# Patient Record
Sex: FEMALE | Race: WHITE | HISPANIC OR LATINO | Employment: UNEMPLOYED | URBAN - METROPOLITAN AREA
[De-identification: names, ages, dates, MRNs, and addresses within clinical notes are randomized per-mention and may not be internally consistent; named-entity substitution may affect disease eponyms.]

---

## 2023-12-20 ENCOUNTER — OFFICE VISIT (OUTPATIENT)
Dept: RHEUMATOLOGY | Facility: CLINIC | Age: 53
End: 2023-12-20
Payer: COMMERCIAL

## 2023-12-20 VITALS
HEIGHT: 62 IN | BODY MASS INDEX: 22.38 KG/M2 | SYSTOLIC BLOOD PRESSURE: 95 MMHG | DIASTOLIC BLOOD PRESSURE: 65 MMHG | TEMPERATURE: 97.5 F | HEART RATE: 89 BPM | WEIGHT: 121.6 LBS

## 2023-12-20 DIAGNOSIS — M19.049 PRIMARY OSTEOARTHRITIS OF HAND, UNSPECIFIED LATERALITY: Primary | ICD-10-CM

## 2023-12-20 PROCEDURE — 99203 OFFICE O/P NEW LOW 30 MIN: CPT | Performed by: STUDENT IN AN ORGANIZED HEALTH CARE EDUCATION/TRAINING PROGRAM

## 2023-12-20 PROCEDURE — 1036F TOBACCO NON-USER: CPT | Performed by: STUDENT IN AN ORGANIZED HEALTH CARE EDUCATION/TRAINING PROGRAM

## 2023-12-20 PROCEDURE — 99213 OFFICE O/P EST LOW 20 MIN: CPT | Mod: GC | Performed by: STUDENT IN AN ORGANIZED HEALTH CARE EDUCATION/TRAINING PROGRAM

## 2023-12-20 RX ORDER — BIMATOPROST 3 UG/ML
1 SOLUTION TOPICAL SEE ADMIN INSTRUCTIONS
COMMUNITY

## 2023-12-20 RX ORDER — BENZONATATE 200 MG/1
200 CAPSULE ORAL EVERY 8 HOURS PRN
COMMUNITY
Start: 2023-12-18 | End: 2024-01-01

## 2023-12-20 RX ORDER — COVID-19 ANTIGEN TEST
KIT MISCELLANEOUS
COMMUNITY
Start: 2023-05-01

## 2023-12-20 RX ORDER — SPIRONOLACTONE 50 MG/1
50 TABLET, FILM COATED ORAL DAILY
COMMUNITY

## 2023-12-20 RX ORDER — DICLOFENAC SODIUM 10 MG/G
4 GEL TOPICAL 4 TIMES DAILY
Qty: 100 G | Refills: 1 | Status: SHIPPED | OUTPATIENT
Start: 2023-12-20

## 2023-12-20 RX ORDER — ATORVASTATIN CALCIUM 20 MG/1
TABLET, FILM COATED ORAL
COMMUNITY

## 2023-12-20 RX ORDER — ALENDRONATE SODIUM 70 MG/1
70 TABLET ORAL
COMMUNITY

## 2023-12-20 RX ORDER — SERTRALINE HYDROCHLORIDE 50 MG/1
50 TABLET, FILM COATED ORAL
COMMUNITY

## 2023-12-20 RX ORDER — AMOXICILLIN AND CLAVULANATE POTASSIUM 250; 125 MG/1; MG/1
875 TABLET, FILM COATED ORAL
COMMUNITY
End: 2024-04-30

## 2023-12-20 RX ORDER — ACETAMINOPHEN 500 MG
TABLET ORAL
COMMUNITY

## 2023-12-20 ASSESSMENT — PAIN SCALES - GENERAL: PAINLEVEL: 0-NO PAIN

## 2023-12-20 NOTE — PROGRESS NOTES
Subjective   Patient ID: [id number]   Qing Salmeron is a 53 y.o. female who presents for osteoartritis.  HPI    52 yo hashimoto's, osteoporosis on alendronate, HLD, here to rule out osteoarthritis.     No headaches, no ocular sxs, mild dry eyes, no dry mouth, no oral or nasal ulcers, No hearing loss, no bleeding, No trouble swallowing, no pleuritis or pericarditis, ,no raynauds,  no low back pain, no skin rashes, no GI sxs, no weight changes, no fevers, No DVT.  No morning stiffness.   No arthralgias.   Pain in the right second and less so in the right middle finger. From the PIP to DIP. Worse with acitivty. No dactylitis. From time to time gets some tingling in the second finger.  Hx of ganglion cyst.     3  miscarriages, three different etiologies        Diclofenac gel and teylonol    Bony hypertrophy mild in DIP. Rule out CTS?    Family hx: OP, osteoarthritis     No smoker, no acohol use, no drug use    Objective   Physical Exam  AO*4  Clear lungs [id number] RRR  Normal ROM in all joints  No active synovitis in joints  No skin rashes  Mild bony hypertrophy in the second right DIP. Mild achyness in the PIP and DIPs, no swelling or active synovitis       Assessment/Plan   52 yo hashimoto's, osteoporosis on alendronate, HLD, here to rule out osteoarthritis in the hands.    Her second right DIP is with mild bony hypertrophy. Besides that there is no concern for an autoimmune rheumatic etiology.    Her intermittent pain in the 2nd digit is likely mild OA, possible mild tendonitis from overuse.   She does report intermittent tingling in this finger. If this worsens and starts to involve additional fingers, would think of carpel tunnel syndrome.     At this point, can use tyelnol and diclofenac gel as needed, and try to avoid overuse.       Can see us in the future as needed    Patient seen with Dr. Monserrat Caba MD  Rheumatology Fellow,PGY-V             Renée Caba MD

## 2023-12-20 NOTE — PROGRESS NOTES
I saw and evaluated the patient. I personally obtained the key and critical portions of the history and physical exam or was physically present for key and critical portions performed by the trainee. I reviewed the trainee's documentation and discussed the patient with the trainee. I agree with the trainee's medical decision making, as documented on the trainee's note.    Chuy German MD

## 2024-01-04 ENCOUNTER — APPOINTMENT (OUTPATIENT)
Dept: RHEUMATOLOGY | Facility: CLINIC | Age: 54
End: 2024-01-04

## 2024-04-30 ENCOUNTER — HOSPITAL ENCOUNTER (EMERGENCY)
Facility: HOSPITAL | Age: 54
Discharge: HOME | End: 2024-04-30
Payer: COMMERCIAL

## 2024-04-30 ENCOUNTER — APPOINTMENT (OUTPATIENT)
Dept: RADIOLOGY | Facility: HOSPITAL | Age: 54
End: 2024-04-30
Payer: COMMERCIAL

## 2024-04-30 VITALS
SYSTOLIC BLOOD PRESSURE: 107 MMHG | HEIGHT: 63 IN | HEART RATE: 76 BPM | BODY MASS INDEX: 21.26 KG/M2 | OXYGEN SATURATION: 100 % | RESPIRATION RATE: 17 BRPM | WEIGHT: 120 LBS | DIASTOLIC BLOOD PRESSURE: 72 MMHG | TEMPERATURE: 98.5 F

## 2024-04-30 DIAGNOSIS — S81.852A DOG BITE OF LEFT LOWER LEG, INITIAL ENCOUNTER: Primary | ICD-10-CM

## 2024-04-30 DIAGNOSIS — S81.832A PUNCTURE WOUND OF LEFT LOWER LEG, INITIAL ENCOUNTER: ICD-10-CM
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
## 2024-04-30 PROCEDURE — 73562 X-RAY EXAM OF KNEE 3: CPT | Mod: LT

## 2024-04-30 PROCEDURE — 73562 X-RAY EXAM OF KNEE 3: CPT | Mod: LEFT SIDE | Performed by: RADIOLOGY

## 2024-04-30 PROCEDURE — 90715 TDAP VACCINE 7 YRS/> IM: CPT | Performed by: PHYSICIAN ASSISTANT

## 2024-04-30 PROCEDURE — 2500000004 HC RX 250 GENERAL PHARMACY W/ HCPCS (ALT 636 FOR OP/ED): Performed by: PHYSICIAN ASSISTANT

## 2024-04-30 PROCEDURE — 90471 IMMUNIZATION ADMIN: CPT | Performed by: PHYSICIAN ASSISTANT

## 2024-04-30 PROCEDURE — 2500000001 HC RX 250 WO HCPCS SELF ADMINISTERED DRUGS (ALT 637 FOR MEDICARE OP): Performed by: PHYSICIAN ASSISTANT

## 2024-04-30 PROCEDURE — 99283 EMERGENCY DEPT VISIT LOW MDM: CPT | Mod: 25

## 2024-04-30 RX ORDER — AMOXICILLIN AND CLAVULANATE POTASSIUM 875; 125 MG/1; MG/1
1 TABLET, FILM COATED ORAL ONCE
Status: COMPLETED | OUTPATIENT
Start: 2024-04-30 | End: 2024-04-30

## 2024-04-30 RX ORDER — AMOXICILLIN AND CLAVULANATE POTASSIUM 875; 125 MG/1; MG/1
1 TABLET, FILM COATED ORAL EVERY 12 HOURS
Qty: 14 TABLET | Refills: 0 | Status: SHIPPED | OUTPATIENT
Start: 2024-04-30 | End: 2024-05-07

## 2024-04-30 RX ADMIN — TETANUS TOXOID, REDUCED DIPHTHERIA TOXOID AND ACELLULAR PERTUSSIS VACCINE, ADSORBED 0.5 ML: 5; 2.5; 8; 8; 2.5 SUSPENSION INTRAMUSCULAR at 17:33

## 2024-04-30 RX ADMIN — AMOXICILLIN AND CLAVULANATE POTASSIUM 1 TABLET: 875; 125 TABLET, FILM COATED ORAL at 17:33

## 2024-04-30 ASSESSMENT — PAIN DESCRIPTION - LOCATION: LOCATION: KNEE

## 2024-04-30 ASSESSMENT — COLUMBIA-SUICIDE SEVERITY RATING SCALE - C-SSRS
6. HAVE YOU EVER DONE ANYTHING, STARTED TO DO ANYTHING, OR PREPARED TO DO ANYTHING TO END YOUR LIFE?: NO
1. IN THE PAST MONTH, HAVE YOU WISHED YOU WERE DEAD OR WISHED YOU COULD GO TO SLEEP AND NOT WAKE UP?: NO
2. HAVE YOU ACTUALLY HAD ANY THOUGHTS OF KILLING YOURSELF?: NO

## 2024-04-30 ASSESSMENT — PAIN DESCRIPTION - ORIENTATION: ORIENTATION: LEFT

## 2024-04-30 ASSESSMENT — PAIN SCALES - GENERAL: PAINLEVEL_OUTOF10: 7

## 2024-04-30 ASSESSMENT — PAIN - FUNCTIONAL ASSESSMENT: PAIN_FUNCTIONAL_ASSESSMENT: 0-10

## 2024-04-30 NOTE — ED TRIAGE NOTES
"C/O NEIGHBORHOOD DOG BITE TO LEFT KNEE, BLEEDING CONTROLLED, PT UNKNOWN IF DOG IS VACCINATED BUT STATES \"I AM SURE IT IS BC IT JUST HAD SURGERY\" AMBULATED TO TRIAGE GAIT STEADY, PT IS UNKNOWN LAST TETANUS   "

## 2024-04-30 NOTE — ED PROVIDER NOTES
"HPI   Chief Complaint   Patient presents with    Animal Bite       History of present illness:  55 yo female presenting today for recent animal bite. Pt was walking her dog when her neighbors dog (a pitbull mix) bit her L knee. The bite was brief, there was no gripping or tearing modality.  The injury was somewhat provoked as patient was approaching the dog when this occurred.  there was bleeding, which has stopped at this point. She is able to walk on her own. There is a \"stinging\" pain at the injury site. Denies numbness, tingling, uncontrolled bleeding, fever, h/o coagulopathy, immune suppression. She is not up to date on her tetanus. The dog is able to be observed, as it is their neighbors dog. The dog is assumed to be up to date on vaccinations, pt states it had a recent surgery.  Took an NSAID recently offering some relief.    Review of systems: Constitutional, eye, ENT, cardiovascular, respiratory, gastrointestinal, genitourinary, neurologic, musculoskeletal, dermatologic, hematologic, endocrine systems were evaluated and were negative unless otherwise specified in history of present illness.    Medications: Reviewed and per nursing note.    Family history: Denies relevant medical conditions.    Social history: Denies tobacco, alcohol, drug use.      Physical exam:    Appearance: Well-developed, well-nourished, nontoxic-appearing, alert and oriented x3. Talking in complete sentences.    HEENT:  Head normocephalic atraumatic,    NECK:  Nml Inspection    Respiratory: Clear to auscultation    Cardiovascular: Regular rate and rhythm. Capillary refill less than 3 seconds on all extremities.    Neuro:  Oriented x 3, Speech Clear, cranial nerves grossly intact. Normal sensation to light touch in all 4 extremities. Deep tendon reflexes 2+ symmetrically in upper and lower extremities.    Musculoskeletal: Patient spontaneously moves all 4 extremities with 5/5 muscle strength.    Skin: 4 puncture wounds on the left leg " anterior subcutaneous tissue above and below the knee with no clear intra-articular seeding.  The 2 puncture wounds on the left upper leg are 1 cm in diameter density subcutaneous tissue.  No clear foreign body seen at the base of bloodless field.                           [place] Coma Scale Score: 15                     Patient History   Past Medical History:   Diagnosis Date    Nontoxic multinodular goiter     Nontoxic multinodular goiter    Nontoxic single thyroid nodule     Solitary thyroid nodule     Past Surgical History:   Procedure Laterality Date    HEMORRHOID SURGERY  12/06/2013    Hemorrhoidectomy     No family history on file.  Social History     Tobacco Use    Smoking status: Never    Smokeless tobacco: Never   Substance Use Topics    Alcohol use: Never    Drug use: Never       Physical Exam   ED Triage Vitals [04/30/24 1602]   Temperature Heart Rate Respirations BP   36.9 °C (98.5 °F) 76 17 107/72      Pulse Ox Temp src Heart Rate Source Patient Position   100 % -- -- --      BP Location FiO2 (%)     -- --       Physical Exam    ED Course & MDM   Diagnoses as of 04/30/24 1842   Dog bite of left lower leg, initial encounter   Puncture wound of left lower leg, initial encounter       Medical Decision Making  XR knee left 3 views   Final Result    Soft tissue swelling over the distal left quadriceps tendon. No    osseous abnormality.          Signed by: Jere Up 4/30/2024 6:20 PM    Dictation workstation:   [id number]         54-year-old female with dog bite injury prior to arrival.  Patient sustained injury to the left leg.  Differential diagnosis laceration, fracture, foreign body, neurovascular exam injury.  Examination shows puncture wounds to the left leg.  This appears to be down to the subcutaneous tissue.  There is no clear intra-articular involvement.  No discrete bone tenderness.    Tetanus shot was updated initiated Augmentin.  Wound was cleansed and loosely approximated.  X-ray of the  "left knee ordered.    X-ray shows no fracture or foreign body.  Patient will need to follow-up with her primary care provider.    Procedure:  Laceration repair  Laceration repair was performed by physician assistant.  Patient has laceration of left leg with four puncture wounds 1cm x 2 lower thigh to subcutaneous tissue.  Site was cleansed with chlorhexidine and irrigated with sterile water.  No foreign body seen at base of bloodless field.  Four 1/4\" steri strips were placed with loose approximation of tissue.  Sterile dressing was applied.  Patient tolerated rated procedure well.  Normal neurovascular exam after procedure.  Blood loss less than 5 mL.  Patient educated on wound care.    Patient will be discharged to home with prescription for Augmentin and to continue using NSAID for pain.  Patient is educated in signs and symptoms of worsening symptoms and reasons to come back to the emergency department.  Will need to follow up with primary care provider.  Patient does not report social determinants of health impacting ability to obtain care that is needed.  Patient agrees with plan.    This is a transcription.  Text was reviewed for errors, but some transcription errors may remain.  Please call for any questions.              Procedure  Procedures     Marquise Martinez PA-C  04/30/24 1842       Marquise Martinez PA-C  04/30/24 1842    "